# Patient Record
Sex: MALE | Race: WHITE
[De-identification: names, ages, dates, MRNs, and addresses within clinical notes are randomized per-mention and may not be internally consistent; named-entity substitution may affect disease eponyms.]

---

## 2018-02-19 ENCOUNTER — HOSPITAL ENCOUNTER (OUTPATIENT)
Dept: HOSPITAL 88 - MRI | Age: 58
End: 2018-02-19
Attending: SPECIALIST
Payer: COMMERCIAL

## 2018-02-19 DIAGNOSIS — S83.241A: Primary | ICD-10-CM

## 2018-02-19 NOTE — DIAGNOSTIC IMAGING REPORT
TECHNIQUE:

Magnetic resonance imaging of the RIGHT KNEE was performed WITHOUT injected

contrast. 



HISTORY:  Tear of medial meniscus

COMPARISON:  None available



FINDINGS: 

LIGAMENTS AND TENDONS:

ACL:  Intact

PCL:  Intact

Collateral ligaments:  Intact

Iliotibial band:  Unremarkable

Popliteal tendon:  Intact

Extensor mechanism:  Intact



JOINT:

Menisci: 

Medial:  Mild contour irregularity, attenuation and increased intrasubstance

signal of the anterior and posterior horns. Peripheral extrusion of the body.

Lateral:  Intact



Articular Cartilage:

Medial Compartment:  High-grade to full-thickness erosion of the weightbearing

cartilage.

Lateral Compartment:  Low to intermediate grade erosion and fissuring of the

posterior weightbearing cartilage.

Patellofemoral Compartment:  Diffuse low-grade erosion and fibrillation of the

patellar cartilage.



Joint Fluid: 

Large nonspecific joint effusion with synovitis.



BONES:  

No focal or infiltrative bone marrow replacing abnormality.  

No acute fracture.

Subchondral bone marrow edema near the periphery of the medial tibial plateau.

Mild subcortical cystic changes of the tibia underlying the region of the

anterior horn insertion of the medial meniscus.



SOFT TISSUES:

Otherwise, unremarkable.





IMPRESSION:

1.  Medial compartment predominant tricompartmental degenerative changes,

including mild degenerative tearing of the medial meniscus.

2.  Synovitis and large nonspecific joint effusion.



Signed by: Dr. Austin Mireles D.O., M.M.M. on 2/19/2018 12:02 PM

## 2018-03-14 ENCOUNTER — HOSPITAL ENCOUNTER (OUTPATIENT)
Dept: HOSPITAL 88 - OR | Age: 58
Discharge: HOME | End: 2018-03-14
Attending: SPECIALIST
Payer: COMMERCIAL

## 2018-03-14 DIAGNOSIS — S83.221A: Primary | ICD-10-CM

## 2018-03-14 DIAGNOSIS — I10: ICD-10-CM

## 2018-03-14 DIAGNOSIS — E78.5: ICD-10-CM

## 2018-03-14 DIAGNOSIS — Z01.810: ICD-10-CM
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
## 2018-03-14 DIAGNOSIS — M67.51: ICD-10-CM

## 2018-03-14 DIAGNOSIS — M17.11: ICD-10-CM

## 2018-03-14 DIAGNOSIS — M22.41: ICD-10-CM

## 2018-03-14 PROCEDURE — 29881 ARTHRS KNE SRG MNISECTMY M/L: CPT

## 2018-03-14 PROCEDURE — 93005 ELECTROCARDIOGRAM TRACING: CPT

## 2018-03-14 NOTE — XMS REPORT
Patient Summary Document

 Created on: 2018



KEVON GENTILE

External Reference #: [id number]

: 1960

Sex: Male



Demographics







 Address  [address] [place]  [address]

 

 Home Phone  [phone]

 

 Preferred Language  Unknown

 

 Marital Status  Unknown

 

 Yarsanism Affiliation  Unknown

 

 Race  Unknown

 

 Additional Race(s)  

 

 Ethnic Group  Unknown





Author







 Author  [facility]

 

 Address  Unknown

 

 Phone  Unavailable







Care Team Providers







 Care Team Member Name  Role  Phone

 

 SHAN HOFFMAN  Unavailable  Unavailable







Problems

This patient has no known problems.



Allergies, Adverse Reactions, Alerts

This patient has no known allergies or adverse reactions.



Medications

This patient has no known medications.



Results







 Test Description  Test Time  Test Comments  Text Results  Atomic Results  
Result Comments









 MRI RIGHT KNEE WO            [address] [address]      Patient Name: KEVON GENTILE   MR #: [id number]    : 1960 Age/Sex: 57/M  Acct #: [id number] Req 
#: [id number]  [facility] Physician:     Ordered by: SHAN HOFFMAN MD  Report #: 
[id number]   Location: MRI  Room/Bed:     _______________________________________
____________________________________________________________    Procedure: [id number]
[id number] MRI/MRI RIGHT KNEE WO  Exam Date: 18                            Exam 
Time: 0810       REPORT STATUS: Signed    TECHNIQUE:   Magnetic resonance 
imaging of the RIGHT KNEE was performed WITHOUT injected   contrast.       
HISTORY:  Tear of medial meniscus   COMPARISON:  None available      FINDINGS: 
   LIGAMENTS AND TENDONS:   ACL:  Intact   PCL:  Intact   Collateral ligaments:
  Intact   Iliotibial band:  Unremarkable   Popliteal tendon:  Intact   
Extensor mechanism:  Intact      JOINT:   Menisci:    Medial:  Mild contour 
irregularity, attenuation and increased intrasubstance   signal of the anterior 
and posterior horns. Peripheral extrusion of the body.   Lateral:  Intact      
Articular Cartilage:   Medial Compartment:  High-grade to full-thickness 
erosion of the weightbearing   cartilage.   Lateral Compartment:  Low to 
intermediate grade erosion and fissuring of the   posterior weightbearing 
cartilage.   Patellofemoral Compartment:  Diffuse low-grade erosion and 
fibrillation of the   patellar cartilage.      Joint Fluid:    Large 
nonspecific joint effusion with synovitis.      BONES:     No focal or 
infiltrative bone marrow replacing abnormality.     No acute fracture.   
Subchondral bone marrow edema near the periphery of the medial tibial plateau. 
  Mild subcortical cystic changes of the tibia underlying the region of the   
anterior horn insertion of the medial meniscus.      SOFT TISSUES:   Otherwise, 
unremarkable.         IMPRESSION:   1.  Medial compartment predominant 
tricompartmental degenerative changes,   including mild degenerative tearing of 
the medial meniscus.   2.  Synovitis and large nonspecific joint effusion.      
Signed by: Dr. Kenna Mireles D.O., M.M.M. on 2018 12:02 PM        Dictated 
By: KENNA MIRELES DO  Electronically Signed By: KENNA MIRELES DO on 18 1202  
Transcribed By: SUZIE on 18 1202       COPY TO:   SHAN HOFFMAN MD

## 2018-03-15 NOTE — OPERATIVE REPORT
DATE OF PROCEDURE:  March 14, 2018 

 

PREOPERATIVE DIAGNOSES

1. Right knee medial meniscus tear.  

2. Right knee degenerative joint disease of the knee.



POSTOPERATIVE DIAGNOSES

1. Right knee medial meniscus tear.  

2. Right knee degenerative joint disease of the knee.  

3. Right knee symptomatic medial shelf plica.  



PROCEDURES PERFORMED

1. Right knee examination under anesthesia.

2. Right knee arthroscopy.  

3. Right knee partial medial meniscectomy.  

4. Right knee chondroplasty of the patella, the trochlea, the medial 

femoral condyle, medial tibial plateau, lateral femoral condyle and 

lateral tibial plateau as well as a resection of medial shelf plica.  



ASSISTANT:  None.  



ANESTHESIA:  General endotracheal intubation anesthesia.  



IV FLUIDS:  Per the anesthesia record.



DESCRIPTION OF PROCEDURE:  Mr. Marie was taken to the operating room and 

placed in the supine position on the operating table.  Following induction 

of general anesthesia as well as endotracheal intubation, the patient's 

right upper extremity was examined under anesthesia.  He was found to have 

a 3+ effusion of the knee joint but an otherwise ligamentously stable knee. 

 The patient's lower extremity was prepped and draped in the standard 

surgical fashion.  A 2-portal technique was used to provide this patient 

arthroscopic evaluation of the knee joint.  The scope cannula was placed 

within the knee joint, and approximately 75 mL of normal-appearing joint 

fluid was extravasated from the joint.  The scope was then placed in the 

joint atraumatically.  Examination of the suprapatellar pouch, medial and 

lateral gutters found no evidence of loose bodies.  There was, however, 

evidence of chondromalacia of the patellar and trochlear surfaces.  Scope 

was advanced in the medial compartment.  Examination of the medial 

compartment demonstrated a torn medial meniscus.  There was also 

chondromalacia of the articulating surfaces.  A combination of biting 

forceps and a motorized shaver was used to resect the torn portion of the 

meniscus.  Chondroplasties of the medial femoral condyle and medial tibial 

plateau were performed at this time.  The scope was then advanced into the 

intercondylar notch.  Anterior cruciate ligament was identified and found 

to be intact.  Scope was advanced into the lateral compartment, and 

chondromalacia of the articulating surfaces was encountered.  

Chondroplasties of the lateral femoral condyle and lateral tibial plateau 

were performed at this time.  The scope was then placed in the 

suprapatellar pouch.  Patient had a large and inflamed medial shelf plica 

interdigitating between the patella and trochlea.  This was resected 

arthroscopically.  Chondroplasties of the lateral femoral condyle and 

lateral tibial plateau were performed at this time.  This was resected at 

this time arthroscopically.  A chondroplasty of the patella and trochlea 

was performed.  The knee was deflated of its sterile normal saline.  Each 

of the portal sites was  closed using 4-0 nylon suture.  Portal sites as 

well as the knee itself were then injected with 0.5% Marcaine with 

epinephrine.  Sterile dressings were applied.  The patient was awakened and 

taken to the postanesthesia care unit in stable condition.  







DD:  03/15/2018 09:07

DT:  03/15/2018 09:39

Job#:  [id number]